# Patient Record
Sex: FEMALE | Race: WHITE
[De-identification: names, ages, dates, MRNs, and addresses within clinical notes are randomized per-mention and may not be internally consistent; named-entity substitution may affect disease eponyms.]

---

## 2019-03-24 ENCOUNTER — HOSPITAL ENCOUNTER (INPATIENT)
Dept: HOSPITAL 19 - COL.ER | Age: 69
LOS: 5 days | Discharge: NURSING, REHAB OR LONG-TERM CARE | DRG: 480 | End: 2019-03-29
Payer: MEDICARE

## 2019-03-24 VITALS — BODY MASS INDEX: 34.82 KG/M2 | WEIGHT: 203.93 LBS | HEIGHT: 64.02 IN

## 2019-03-24 DIAGNOSIS — S72.142A: Primary | ICD-10-CM

## 2019-03-24 DIAGNOSIS — M25.511: ICD-10-CM

## 2019-03-24 DIAGNOSIS — L03.311: ICD-10-CM

## 2019-03-24 DIAGNOSIS — E87.0: ICD-10-CM

## 2019-03-24 DIAGNOSIS — Z85.828: ICD-10-CM

## 2019-03-24 DIAGNOSIS — Z23: ICD-10-CM

## 2019-03-24 DIAGNOSIS — R65.20: ICD-10-CM

## 2019-03-24 DIAGNOSIS — M62.82: ICD-10-CM
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
## 2019-03-24 DIAGNOSIS — E87.2: ICD-10-CM

## 2019-03-24 DIAGNOSIS — E44.0: ICD-10-CM

## 2019-03-24 DIAGNOSIS — A41.9: ICD-10-CM

## 2019-03-24 DIAGNOSIS — R62.7: ICD-10-CM

## 2019-03-24 DIAGNOSIS — Z85.3: ICD-10-CM

## 2019-03-24 DIAGNOSIS — D64.9: ICD-10-CM

## 2019-03-24 LAB
ALBUMIN SERPL-MCNC: 4.1 GM/DL (ref 3.5–5)
ALP SERPL-CCNC: 129 U/L (ref 50–136)
ALT SERPL-CCNC: 47 U/L (ref 9–52)
ANION GAP SERPL CALC-SCNC: 15 MMOL/L (ref 7–16)
AST SERPL-CCNC: 54 U/L (ref 15–37)
BASOPHILS # BLD: 0.1 10*3/UL (ref 0–0.2)
BASOPHILS NFR BLD AUTO: 0.5 % (ref 0–2)
BILIRUB SERPL-MCNC: 1.8 MG/DL (ref 0–1)
BUN SERPL-MCNC: 53 MG/DL (ref 7–17)
CALCIUM SERPL-MCNC: 9.8 MG/DL (ref 8.4–10.2)
CHLORIDE SERPL-SCNC: 111 MMOL/L (ref 98–107)
CK SERPL-CCNC: 436 U/L (ref 30–135)
CO2 SERPL-SCNC: 21 MMOL/L (ref 22–30)
CREAT SERPL-SCNC: 0.93 MG/DL (ref 0.52–1.25)
EOSINOPHIL # BLD: 0.1 10*3/UL (ref 0–0.7)
EOSINOPHIL NFR BLD: 0.3 % (ref 0–4)
ERYTHROCYTE [DISTWIDTH] IN BLOOD BY AUTOMATED COUNT: 13.6 % (ref 11.5–14.5)
GLUCOSE SERPL-MCNC: 132 MG/DL (ref 74–106)
GRANULOCYTES # BLD AUTO: 84.7 % (ref 42.2–75.2)
HCT VFR BLD AUTO: 47.1 % (ref 37–47)
HGB BLD-MCNC: 15.4 G/DL (ref 12.5–16)
HYALINE CASTS #/AREA URNS LPF: (no result) /LPF
INR BLD: 1 (ref 0.8–3)
LYMPHOCYTES # BLD: 1.2 10*3/UL (ref 1.2–3.4)
LYMPHOCYTES NFR BLD: 7.6 % (ref 20–51)
MCH RBC QN AUTO: 29 PG (ref 27–31)
MCHC RBC AUTO-ENTMCNC: 33 G/DL (ref 33–37)
MCV RBC AUTO: 90 FL (ref 80–100)
MONOCYTES # BLD: 1 10*3/UL (ref 0.1–0.6)
MONOCYTES NFR BLD AUTO: 6.1 % (ref 1.7–9.3)
NEUTROPHILS # BLD: 13.5 10*3/UL (ref 1.4–6.5)
PH UR STRIP.AUTO: 5 [PH] (ref 5–8)
PLATELET # BLD AUTO: 235 K/MM3 (ref 130–400)
PMV BLD AUTO: 9.8 FL (ref 7.4–10.4)
POTASSIUM SERPL-SCNC: 4.4 MMOL/L (ref 3.4–5)
PROT SERPL-MCNC: 8.4 GM/DL (ref 6.4–8.2)
PROTHROMBIN TIME: 11.4 SECONDS (ref 9.7–12.8)
RBC # BLD AUTO: 5.23 M/MM3 (ref 4.1–5.3)
RBC # UR: (no result) /HPF
SODIUM SERPL-SCNC: 147 MMOL/L (ref 137–145)
SP GR UR STRIP.AUTO: 1.03 (ref 1–1.03)
SQUAMOUS # URNS: (no result) /HPF
TROPONIN I SERPL-MCNC: < 0.012 NG/ML (ref 0–0.04)
UA DIPSTICK PNL UR STRIP.AUTO: (no result)
URN COLLECT METHOD CLASS: (no result)
UROBILINOGEN UR STRIP.AUTO-MCNC: 2 MG/DL

## 2019-03-24 PROCEDURE — C1713 ANCHOR/SCREW BN/BN,TIS/BN: HCPCS

## 2019-03-24 PROCEDURE — C1751 CATH, INF, PER/CENT/MIDLINE: HCPCS

## 2019-03-24 PROCEDURE — A9284 NON-ELECTRONIC SPIROMETER: HCPCS

## 2019-03-24 PROCEDURE — C9113 INJ PANTOPRAZOLE SODIUM, VIA: HCPCS

## 2019-03-24 PROCEDURE — A4314 CATH W/DRAINAGE 2-WAY LATEX: HCPCS

## 2019-03-24 PROCEDURE — P9016 RBC LEUKOCYTES REDUCED: HCPCS

## 2019-03-25 VITALS — OXYGEN SATURATION: 77 %

## 2019-03-25 VITALS — OXYGEN SATURATION: 98 %

## 2019-03-25 VITALS — OXYGEN SATURATION: 100 %

## 2019-03-25 VITALS
HEART RATE: 105 BPM | SYSTOLIC BLOOD PRESSURE: 120 MMHG | OXYGEN SATURATION: 97 % | TEMPERATURE: 98 F | DIASTOLIC BLOOD PRESSURE: 80 MMHG

## 2019-03-25 VITALS — OXYGEN SATURATION: 99 %

## 2019-03-25 VITALS — OXYGEN SATURATION: 96 %

## 2019-03-25 VITALS — OXYGEN SATURATION: 90 %

## 2019-03-25 VITALS — OXYGEN SATURATION: 87 %

## 2019-03-25 VITALS — OXYGEN SATURATION: 92 %

## 2019-03-25 VITALS — OXYGEN SATURATION: 97 %

## 2019-03-25 VITALS — OXYGEN SATURATION: 89 %

## 2019-03-25 VITALS — OXYGEN SATURATION: 93 %

## 2019-03-25 VITALS — SYSTOLIC BLOOD PRESSURE: 111 MMHG | TEMPERATURE: 98 F | DIASTOLIC BLOOD PRESSURE: 92 MMHG | HEART RATE: 106 BPM

## 2019-03-25 VITALS — OXYGEN SATURATION: 95 %

## 2019-03-25 VITALS
TEMPERATURE: 97.7 F | SYSTOLIC BLOOD PRESSURE: 111 MMHG | OXYGEN SATURATION: 100 % | DIASTOLIC BLOOD PRESSURE: 69 MMHG | HEART RATE: 105 BPM

## 2019-03-25 VITALS — OXYGEN SATURATION: 94 %

## 2019-03-25 VITALS — OXYGEN SATURATION: 100 % | HEART RATE: 104 BPM | SYSTOLIC BLOOD PRESSURE: 110 MMHG | DIASTOLIC BLOOD PRESSURE: 63 MMHG

## 2019-03-25 VITALS — OXYGEN SATURATION: 81 %

## 2019-03-25 VITALS — OXYGEN SATURATION: 80 %

## 2019-03-25 VITALS — OXYGEN SATURATION: 86 %

## 2019-03-25 VITALS
HEART RATE: 112 BPM | OXYGEN SATURATION: 73 % | DIASTOLIC BLOOD PRESSURE: 108 MMHG | TEMPERATURE: 97.7 F | SYSTOLIC BLOOD PRESSURE: 124 MMHG

## 2019-03-25 VITALS — DIASTOLIC BLOOD PRESSURE: 50 MMHG | HEART RATE: 118 BPM | TEMPERATURE: 98.2 F | SYSTOLIC BLOOD PRESSURE: 143 MMHG

## 2019-03-25 VITALS
DIASTOLIC BLOOD PRESSURE: 52 MMHG | HEART RATE: 110 BPM | OXYGEN SATURATION: 96 % | TEMPERATURE: 98 F | SYSTOLIC BLOOD PRESSURE: 100 MMHG

## 2019-03-25 VITALS — OXYGEN SATURATION: 61 %

## 2019-03-25 VITALS — OXYGEN SATURATION: 66 %

## 2019-03-25 VITALS — OXYGEN SATURATION: 82 %

## 2019-03-25 VITALS — OXYGEN SATURATION: 88 %

## 2019-03-25 VITALS — OXYGEN SATURATION: 91 %

## 2019-03-25 VITALS — OXYGEN SATURATION: 98 % | HEART RATE: 114 BPM | SYSTOLIC BLOOD PRESSURE: 103 MMHG | DIASTOLIC BLOOD PRESSURE: 43 MMHG

## 2019-03-25 VITALS
HEART RATE: 107 BPM | DIASTOLIC BLOOD PRESSURE: 61 MMHG | TEMPERATURE: 98.6 F | SYSTOLIC BLOOD PRESSURE: 114 MMHG | OXYGEN SATURATION: 100 %

## 2019-03-25 VITALS — OXYGEN SATURATION: 70 %

## 2019-03-25 VITALS — OXYGEN SATURATION: 67 %

## 2019-03-25 VITALS — OXYGEN SATURATION: 79 %

## 2019-03-25 VITALS — OXYGEN SATURATION: 85 %

## 2019-03-25 VITALS — OXYGEN SATURATION: 76 %

## 2019-03-25 VITALS — OXYGEN SATURATION: 68 %

## 2019-03-25 VITALS — DIASTOLIC BLOOD PRESSURE: 64 MMHG | OXYGEN SATURATION: 100 % | SYSTOLIC BLOOD PRESSURE: 89 MMHG | HEART RATE: 109 BPM

## 2019-03-25 VITALS — OXYGEN SATURATION: 83 %

## 2019-03-25 VITALS — OXYGEN SATURATION: 78 %

## 2019-03-25 VITALS — OXYGEN SATURATION: 64 %

## 2019-03-25 VITALS — OXYGEN SATURATION: 75 %

## 2019-03-25 VITALS — OXYGEN SATURATION: 72 %

## 2019-03-25 LAB
ANION GAP SERPL CALC-SCNC: 5 MMOL/L (ref 7–16)
BASOPHILS # BLD: 0.1 10*3/UL (ref 0–0.2)
BASOPHILS NFR BLD AUTO: 0.4 % (ref 0–2)
BUN SERPL-MCNC: 43 MG/DL (ref 7–17)
CALCIUM SERPL-MCNC: 8.5 MG/DL (ref 8.4–10.2)
CHLORIDE SERPL-SCNC: 119 MMOL/L (ref 98–107)
CO2 SERPL-SCNC: 24 MMOL/L (ref 22–30)
CREAT SERPL-SCNC: 0.92 MG/DL (ref 0.52–1.25)
EOSINOPHIL # BLD: 0 10*3/UL (ref 0–0.7)
EOSINOPHIL NFR BLD: 0.3 % (ref 0–4)
ERYTHROCYTE [DISTWIDTH] IN BLOOD BY AUTOMATED COUNT: 14 % (ref 11.5–14.5)
GLUCOSE SERPL-MCNC: 113 MG/DL (ref 74–106)
GRANULOCYTES # BLD AUTO: 77.2 % (ref 42.2–75.2)
HCT VFR BLD AUTO: 33.6 % (ref 37–47)
HCT VFR BLD AUTO: 37.9 % (ref 37–47)
HGB BLD-MCNC: 10.2 G/DL (ref 12.5–16)
HGB BLD-MCNC: 11.8 G/DL (ref 12.5–16)
LYMPHOCYTES # BLD: 1.8 10*3/UL (ref 1.2–3.4)
LYMPHOCYTES NFR BLD: 12.4 % (ref 20–51)
MAGNESIUM SERPL-MCNC: 2.2 MG/DL (ref 1.6–2.3)
MCH RBC QN AUTO: 29 PG (ref 27–31)
MCHC RBC AUTO-ENTMCNC: 31 G/DL (ref 33–37)
MCV RBC AUTO: 94 FL (ref 80–100)
MONOCYTES # BLD: 1.3 10*3/UL (ref 0.1–0.6)
MONOCYTES NFR BLD AUTO: 9.2 % (ref 1.7–9.3)
NEUTROPHILS # BLD: 11 10*3/UL (ref 1.4–6.5)
PLATELET # BLD AUTO: 200 K/MM3 (ref 130–400)
PMV BLD AUTO: 10.4 FL (ref 7.4–10.4)
POTASSIUM SERPL-SCNC: 3.9 MMOL/L (ref 3.4–5)
RBC # BLD AUTO: 4.05 M/MM3 (ref 4.1–5.3)
SODIUM SERPL-SCNC: 148 MMOL/L (ref 137–145)

## 2019-03-25 NOTE — NUR
SW met with patient and cousin to discuss discharge planning.  Patient lives
alone in [place] and was down for four days before being found.  Patients
PCP is Dr Sutton and she obtains her medications from [facility].
Patient utilizes a walker but was not using it when she fell.  Patient does
not have a DPOA but was interested in one.  SW provided a form and will follow
up with patient is ready to sign.  Patient was presented with a choice form
for SNF and would like #1 [facility] and #2 [facility].  If those are unable to accept she
would like to go to somewhere in [place].  BRISA will continue to follow.

## 2019-03-25 NOTE — NUR
Per pt request, Juan Carlos Najera and Jessica Aria called and given update, ICU
phone number and privacy access code provided. Pt states she will not consent
to any procedure or sugery (PICC or GammaNail, respectively) until Juan Carlos Najera
is here. Juan Carlos stated that he has work today and doesnt get off until 5, but
will try to ask his boss to have the day off so as to be present for Nay.
Pt AAOx4, able to recall aforementioned family/friend's phone numbers from
memory. Conversation appropriate. Bedside report recieved from JACQUELINE Spangler - head
to toe assessment performed together - skin breakdown noted on abdomen, right
toe, breast, and under right breast - coccyx and gluteal fold unable to assess
d/t pain reported by pt when turn attempted.
Pain with rest/no movement rated at 0/10.

## 2019-03-25 NOTE — NUR
PT VERY PLEASANT. RATES PAIN 7.5/10 ON NUMERIC SCALE DESCRIBING PAIN AS AN
ACHE IN LEFT HIP RADIATING DOWN TO LEFT FOOT.
PT HAS TWO 20G IV'S, ONE IN RIGHT SHOULDER AND ONE IN RIGHT BREAST. PT STATES
SHE WAS LAYING FOR FOUR DAYS ON RIGHT SIDE OF BODY. RIGHT SHOULDER, RIGHT
BREAST, RIGHT FLANK, AND ABDOMEN ARE REDDENED. PT HAS EXCORIATION TO ABD,
RIGHT BREAST, AND GROIN. PT HAS LEFT MASTECTOMY FROM CANCER. SCARS TO RIGHT
SHOULDER FROM SHOULDER REPLACEMENT, TWO SCARS ON ABD FROM KIDNEY STENT AND
GALLBLADDER SURGERY.
PT HAS SLIGHT WEAKNESS ON RIGHT ARM.

## 2019-03-25 NOTE — NUR
Pt was assisted to more of a seated position with medication administration.
Pt agreed to drink some fluids at this time and ate some jello following
passing bedside swallow study. Pt tolerated clear liquids well. Pt agreed to
reposition to left side at this time. Pt wanted to attempt to turn on own
although extra staff member was obtained due to stiffness and decreased
mobility to right arm. Pt denied any pain prior to movement although hollered
out with repositioning onto left hip. Once pt was able to settle down
following repositioning pt verbalized improvement in pain. Education on
needing to stay on top of pain was provided with verbal agreement with pt
received to notify nurse when pt begins to return.

## 2019-03-25 NOTE — NUR
PT TRANSFERRED TO ICU3 AS SHE IS APPARENTLY SEPTIC.  REPORT CALLED TO ICU RN.
PT TRANSFERRED VIA HER BED WITH HER BELONGINGS.  PT HAD VALUABLES LOCKED UP IN
SAFE BY HOUSE SUPERVISOR.

## 2019-03-25 NOTE — NUR
Woke pt up in order to complete assessment at this time. Pt pleasant although
lethargic at this time. Cooperative with assessment and demonstrated ability
to follow instructions. Pt stated todays date was Thursday although was able
to correctly report the month.

## 2019-03-25 NOTE — NUR
Pt arrived on ICU bed awake and alert complaining of no pain. Left hip
assessed with Sierra,RN - pin point size spotting of blood through aquacell and
is CDI. Ice pack in place. VSS, unlabored respirations on 2L via nasal
cannula, 2pillows placed between legs for abduction, bilateral DEXTER and SCD in
place, pulses distally 2+ bilaterally, following commands, appropriate
conversation, cousin at bedside now from waiting room,

## 2019-03-25 NOTE — NUR
BRIAN Velarde with surgical team called to get update. Estimated time is unknown
for surgcial intervention - did mention to her that pt would like Juan Carlos to be
present.
Pt did consent to PICC line placement without his presence. Pt remains alert
and oriented.
Xray confirmation of PICC confirmed -  at bedside collecting sample
from central line

## 2019-03-26 VITALS — OXYGEN SATURATION: 100 %

## 2019-03-26 VITALS — OXYGEN SATURATION: 99 %

## 2019-03-26 VITALS — OXYGEN SATURATION: 96 %

## 2019-03-26 VITALS — OXYGEN SATURATION: 92 %

## 2019-03-26 VITALS — SYSTOLIC BLOOD PRESSURE: 101 MMHG | HEART RATE: 104 BPM | DIASTOLIC BLOOD PRESSURE: 50 MMHG | OXYGEN SATURATION: 99 %

## 2019-03-26 VITALS — OXYGEN SATURATION: 98 %

## 2019-03-26 VITALS — TEMPERATURE: 97.3 F | SYSTOLIC BLOOD PRESSURE: 107 MMHG | HEART RATE: 110 BPM | DIASTOLIC BLOOD PRESSURE: 67 MMHG

## 2019-03-26 VITALS
DIASTOLIC BLOOD PRESSURE: 51 MMHG | HEART RATE: 101 BPM | OXYGEN SATURATION: 100 % | TEMPERATURE: 98.7 F | SYSTOLIC BLOOD PRESSURE: 98 MMHG

## 2019-03-26 VITALS — OXYGEN SATURATION: 95 %

## 2019-03-26 VITALS — OXYGEN SATURATION: 93 %

## 2019-03-26 VITALS — OXYGEN SATURATION: 97 %

## 2019-03-26 VITALS — OXYGEN SATURATION: 94 %

## 2019-03-26 VITALS — OXYGEN SATURATION: 88 %

## 2019-03-26 VITALS — OXYGEN SATURATION: 84 %

## 2019-03-26 VITALS — OXYGEN SATURATION: 86 %

## 2019-03-26 VITALS
DIASTOLIC BLOOD PRESSURE: 48 MMHG | SYSTOLIC BLOOD PRESSURE: 94 MMHG | OXYGEN SATURATION: 100 % | HEART RATE: 99 BPM | TEMPERATURE: 99.1 F

## 2019-03-26 VITALS — OXYGEN SATURATION: 90 %

## 2019-03-26 VITALS — OXYGEN SATURATION: 91 %

## 2019-03-26 VITALS — OXYGEN SATURATION: 89 %

## 2019-03-26 VITALS
TEMPERATURE: 99 F | SYSTOLIC BLOOD PRESSURE: 107 MMHG | OXYGEN SATURATION: 94 % | HEART RATE: 99 BPM | DIASTOLIC BLOOD PRESSURE: 61 MMHG

## 2019-03-26 VITALS — DIASTOLIC BLOOD PRESSURE: 50 MMHG | SYSTOLIC BLOOD PRESSURE: 105 MMHG | HEART RATE: 96 BPM | TEMPERATURE: 97.9 F

## 2019-03-26 VITALS — OXYGEN SATURATION: 85 %

## 2019-03-26 LAB
ANION GAP SERPL CALC-SCNC: 3 MMOL/L (ref 7–16)
BASOPHILS # BLD: 0 10*3/UL (ref 0–0.2)
BASOPHILS NFR BLD AUTO: 0.2 % (ref 0–2)
BUN SERPL-MCNC: 25 MG/DL (ref 7–17)
CALCIUM SERPL-MCNC: 7.9 MG/DL (ref 8.4–10.2)
CHLORIDE SERPL-SCNC: 118 MMOL/L (ref 98–107)
CO2 SERPL-SCNC: 23 MMOL/L (ref 22–30)
CREAT SERPL-SCNC: 0.93 MG/DL (ref 0.52–1.25)
EOSINOPHIL # BLD: 0 10*3/UL (ref 0–0.7)
EOSINOPHIL NFR BLD: 0 % (ref 0–4)
ERYTHROCYTE [DISTWIDTH] IN BLOOD BY AUTOMATED COUNT: 14 % (ref 11.5–14.5)
GLUCOSE SERPL-MCNC: 118 MG/DL (ref 74–106)
GRANULOCYTES # BLD AUTO: 84.6 % (ref 42.2–75.2)
HCT VFR BLD AUTO: 26.5 % (ref 37–47)
HGB BLD-MCNC: 8.1 G/DL (ref 12.5–16)
LYMPHOCYTES # BLD: 0.8 10*3/UL (ref 1.2–3.4)
LYMPHOCYTES NFR BLD: 7.1 % (ref 20–51)
MCH RBC QN AUTO: 29 PG (ref 27–31)
MCHC RBC AUTO-ENTMCNC: 31 G/DL (ref 33–37)
MCV RBC AUTO: 96 FL (ref 80–100)
MONOCYTES # BLD: 0.8 10*3/UL (ref 0.1–0.6)
MONOCYTES NFR BLD AUTO: 7.5 % (ref 1.7–9.3)
NEUTROPHILS # BLD: 9.1 10*3/UL (ref 1.4–6.5)
PLATELET # BLD AUTO: 130 K/MM3 (ref 130–400)
PMV BLD AUTO: 10 FL (ref 7.4–10.4)
POTASSIUM SERPL-SCNC: 3.9 MMOL/L (ref 3.4–5)
RBC # BLD AUTO: 2.75 M/MM3 (ref 4.1–5.3)
SODIUM SERPL-SCNC: 144 MMOL/L (ref 137–145)

## 2019-03-26 PROCEDURE — 0QS736Z REPOSITION LEFT UPPER FEMUR WITH INTRAMEDULLARY INTERNAL FIXATION DEVICE, PERCUTANEOUS APPROACH: ICD-10-PCS | Performed by: ORTHOPAEDIC SURGERY

## 2019-03-26 NOTE — NUR
awake and CNA in and checking vital signs, assessment completed, see shift
assessment for further info, will assist her with ordering supper, denies urge
to void or other needs

## 2019-03-26 NOTE — NUR
BRISA and SW student met with patient after clinical rounding.  She would like a
referral sent to [facility] as well.  SW fax[facility]  She reports she is going to be gone after today for a
week so she should be able to have a decision today.
 
[facility] has accepted patient.

## 2019-03-26 NOTE — NUR
Patient accepted to [facility] for tomorrow or thursday.  If patient
dc tomorrow the doc to doc will be with Dr Bah [phone].  Nurses need
called tomorrow with update if patient will be coming and what day.  BRISA
informed BRISA Eagle on surgical floor.

## 2019-03-26 NOTE — NUR
Dr. Knight rounds on patient at this time. Orders as entered CPOE. Provided
states preference for patient to move to surgical floor today.

## 2019-03-26 NOTE — NUR
Shift assessment complete at this time. Plan of care reviewed with patient et
family at bedside. Additional time taken to address any other needs or
concerns. Denies pain or discomfort. Will continue to monitor.

## 2019-03-26 NOTE — NUR
SW and SW student met with the patient to inform of [facility]
accepting and how she will need transportation if she chooses to pursue
[facility]. The patient reports that she only has her cousin and is not sure if
he would be able to provide transport. The patient reports that she needs to
tonight to think about whether she wants to pursue [facility],
[facility], or [facility]. SW to follow up with the patient
tomorrow morning, 3/27.

## 2019-03-27 VITALS — DIASTOLIC BLOOD PRESSURE: 69 MMHG | SYSTOLIC BLOOD PRESSURE: 127 MMHG | HEART RATE: 101 BPM | TEMPERATURE: 98.3 F

## 2019-03-27 VITALS — SYSTOLIC BLOOD PRESSURE: 133 MMHG | HEART RATE: 109 BPM | TEMPERATURE: 98 F | DIASTOLIC BLOOD PRESSURE: 64 MMHG

## 2019-03-27 VITALS — HEART RATE: 97 BPM | DIASTOLIC BLOOD PRESSURE: 80 MMHG | SYSTOLIC BLOOD PRESSURE: 118 MMHG | TEMPERATURE: 98.4 F

## 2019-03-27 VITALS — DIASTOLIC BLOOD PRESSURE: 84 MMHG | SYSTOLIC BLOOD PRESSURE: 115 MMHG | TEMPERATURE: 98.1 F | HEART RATE: 132 BPM

## 2019-03-27 VITALS — DIASTOLIC BLOOD PRESSURE: 66 MMHG | SYSTOLIC BLOOD PRESSURE: 110 MMHG | TEMPERATURE: 98 F | HEART RATE: 110 BPM

## 2019-03-27 VITALS — SYSTOLIC BLOOD PRESSURE: 99 MMHG | TEMPERATURE: 98.9 F | HEART RATE: 122 BPM | DIASTOLIC BLOOD PRESSURE: 52 MMHG

## 2019-03-27 VITALS — HEART RATE: 95 BPM | DIASTOLIC BLOOD PRESSURE: 61 MMHG | TEMPERATURE: 98.5 F | SYSTOLIC BLOOD PRESSURE: 129 MMHG

## 2019-03-27 LAB
ANION GAP SERPL CALC-SCNC: 3 MMOL/L (ref 7–16)
BASOPHILS # BLD: 0 10*3/UL (ref 0–0.2)
BASOPHILS NFR BLD AUTO: 0.3 % (ref 0–2)
BUN SERPL-MCNC: 18 MG/DL (ref 7–17)
CALCIUM SERPL-MCNC: 8 MG/DL (ref 8.4–10.2)
CHLORIDE SERPL-SCNC: 114 MMOL/L (ref 98–107)
CO2 SERPL-SCNC: 25 MMOL/L (ref 22–30)
CREAT SERPL-SCNC: 0.9 MG/DL (ref 0.52–1.25)
EOSINOPHIL # BLD: 0.2 10*3/UL (ref 0–0.7)
EOSINOPHIL NFR BLD: 2 % (ref 0–4)
ERYTHROCYTE [DISTWIDTH] IN BLOOD BY AUTOMATED COUNT: 13.8 % (ref 11.5–14.5)
GLUCOSE SERPL-MCNC: 103 MG/DL (ref 74–106)
GRANULOCYTES # BLD AUTO: 75.4 % (ref 42.2–75.2)
HCT VFR BLD AUTO: 25.3 % (ref 37–47)
HGB BLD-MCNC: 7.8 G/DL (ref 12.5–16)
LYMPHOCYTES # BLD: 1.3 10*3/UL (ref 1.2–3.4)
LYMPHOCYTES NFR BLD: 14.9 % (ref 20–51)
MCH RBC QN AUTO: 29 PG (ref 27–31)
MCHC RBC AUTO-ENTMCNC: 31 G/DL (ref 33–37)
MCV RBC AUTO: 95 FL (ref 80–100)
MONOCYTES # BLD: 0.6 10*3/UL (ref 0.1–0.6)
MONOCYTES NFR BLD AUTO: 6.8 % (ref 1.7–9.3)
NEUTROPHILS # BLD: 6.7 10*3/UL (ref 1.4–6.5)
PLATELET # BLD AUTO: 123 K/MM3 (ref 130–400)
PMV BLD AUTO: 9.8 FL (ref 7.4–10.4)
POTASSIUM SERPL-SCNC: 3.7 MMOL/L (ref 3.4–5)
RBC # BLD AUTO: 2.66 M/MM3 (ref 4.1–5.3)
SODIUM SERPL-SCNC: 142 MMOL/L (ref 137–145)

## 2019-03-27 NOTE — NUR
physical therapy in and worked with patient and assisted her out of bed and
into recliner, assisted her with ordering lunch

## 2019-03-27 NOTE — NUR
assisted up to bedside commode after much encouragement, took few short steps
to commode, had small bowel movment while up

## 2019-03-27 NOTE — NUR
Patient in bed, is alert and oriented x3. Has right PICC without redness or
swelling. Watching TV. Has numerous abrasions and healing lesions on rt breast
and rt abdomen. Weakened right arm,  are equal. Has had left mastectomy.
Bilateral lower leg swelling. Wearing SCD's bilaterally. Uses call light
effectively.

## 2019-03-27 NOTE — NUR
appears to be dozing, awakened and full assessment completed, see
interventions for further info, breakfast ordered

## 2019-03-27 NOTE — NUR
physical therapy in to work with patient, therapist and CNA assisted her to
bedside commode and she voided and then back into bed, CNA will help her with
ordering supper

## 2019-03-27 NOTE — NUR
Dr Knight and care team in to see patient, She is sitting up in bed eating
breakfast, telemetry discontinued and IV fluis stopped

## 2019-03-27 NOTE — NUR
BRISA and BRISA student attended clinical rounds and then followed up with the
patient on preference for post-acute rehab. The patient reports that she has
decided to pursue [facility]. SW contacted and updated Emma at
[facility]. SW to update [facility] and
continue to follow.

## 2019-03-27 NOTE — NUR
Pt sleeping comfortably in bed. Denies pain or any other discomfort. Vitals
stable at this time. Will continue to monitor.

## 2019-03-27 NOTE — NUR
Pt resting in bed. Vitals stable. Reports L hip pain improving after PRN
administration. Denies any other complaints. Will continue to monitor.

## 2019-03-28 VITALS — SYSTOLIC BLOOD PRESSURE: 114 MMHG | DIASTOLIC BLOOD PRESSURE: 59 MMHG | HEART RATE: 94 BPM | TEMPERATURE: 97.6 F

## 2019-03-28 VITALS — HEART RATE: 106 BPM | SYSTOLIC BLOOD PRESSURE: 127 MMHG | DIASTOLIC BLOOD PRESSURE: 74 MMHG | TEMPERATURE: 98.2 F

## 2019-03-28 VITALS — DIASTOLIC BLOOD PRESSURE: 70 MMHG | HEART RATE: 110 BPM | TEMPERATURE: 98.2 F | SYSTOLIC BLOOD PRESSURE: 125 MMHG

## 2019-03-28 VITALS — SYSTOLIC BLOOD PRESSURE: 129 MMHG | TEMPERATURE: 97.8 F | DIASTOLIC BLOOD PRESSURE: 50 MMHG | HEART RATE: 110 BPM

## 2019-03-28 VITALS — DIASTOLIC BLOOD PRESSURE: 57 MMHG | TEMPERATURE: 98.8 F | SYSTOLIC BLOOD PRESSURE: 134 MMHG | HEART RATE: 113 BPM

## 2019-03-28 VITALS — HEART RATE: 112 BPM | DIASTOLIC BLOOD PRESSURE: 65 MMHG | TEMPERATURE: 98.7 F | SYSTOLIC BLOOD PRESSURE: 115 MMHG

## 2019-03-28 VITALS — TEMPERATURE: 98.2 F | HEART RATE: 112 BPM | DIASTOLIC BLOOD PRESSURE: 68 MMHG | SYSTOLIC BLOOD PRESSURE: 99 MMHG

## 2019-03-28 VITALS — DIASTOLIC BLOOD PRESSURE: 50 MMHG | TEMPERATURE: 99 F | SYSTOLIC BLOOD PRESSURE: 129 MMHG | HEART RATE: 113 BPM

## 2019-03-28 VITALS — HEART RATE: 94 BPM | TEMPERATURE: 99.5 F | DIASTOLIC BLOOD PRESSURE: 54 MMHG | SYSTOLIC BLOOD PRESSURE: 96 MMHG

## 2019-03-28 VITALS — HEART RATE: 102 BPM | TEMPERATURE: 97.8 F | DIASTOLIC BLOOD PRESSURE: 59 MMHG | SYSTOLIC BLOOD PRESSURE: 112 MMHG

## 2019-03-28 LAB
ANION GAP SERPL CALC-SCNC: 5 MMOL/L (ref 7–16)
BASOPHILS # BLD: 0 10*3/UL (ref 0–0.2)
BASOPHILS NFR BLD AUTO: 0.4 % (ref 0–2)
BUN SERPL-MCNC: 21 MG/DL (ref 7–17)
CALCIUM SERPL-MCNC: 8.2 MG/DL (ref 8.4–10.2)
CHLORIDE SERPL-SCNC: 111 MMOL/L (ref 98–107)
CO2 SERPL-SCNC: 25 MMOL/L (ref 22–30)
CREAT SERPL-SCNC: 0.99 UMOL/L (ref 0.52–1.25)
EOSINOPHIL # BLD: 0.4 10*3/UL (ref 0–0.7)
EOSINOPHIL NFR BLD: 5 % (ref 0–4)
ERYTHROCYTE [DISTWIDTH] IN BLOOD BY AUTOMATED COUNT: 14.1 % (ref 11.5–14.5)
GLUCOSE SERPL-MCNC: 94 MG/DL (ref 74–106)
GRANULOCYTES # BLD AUTO: 63.1 % (ref 42.2–75.2)
HCT VFR BLD AUTO: 23.1 % (ref 37–47)
HGB BLD-MCNC: 7.2 G/DL (ref 12.5–16)
LYMPHOCYTES # BLD: 2 10*3/UL (ref 1.2–3.4)
LYMPHOCYTES NFR BLD: 24.1 % (ref 20–51)
MCH RBC QN AUTO: 30 PG (ref 27–31)
MCHC RBC AUTO-ENTMCNC: 31 G/DL (ref 33–37)
MCV RBC AUTO: 95 FL (ref 80–100)
MONOCYTES # BLD: 0.5 10*3/UL (ref 0.1–0.6)
MONOCYTES NFR BLD AUTO: 6.2 % (ref 1.7–9.3)
NEUTROPHILS # BLD: 5.2 10*3/UL (ref 1.4–6.5)
PLATELET # BLD AUTO: 134 K/MM3 (ref 130–400)
PMV BLD AUTO: 9.8 FL (ref 7.4–10.4)
POTASSIUM SERPL-SCNC: 3.7 MMOL/L (ref 3.4–5)
RBC # BLD AUTO: 2.44 M/MM3 (ref 4.1–5.3)
SODIUM SERPL-SCNC: 141 MMOL/L (ref 137–145)

## 2019-03-28 NOTE — NUR
assisted from bedside commode to recliner, is a max 2 assist and she has
difficulty standing straight, abrasion to right breast with some oozing and
covered with telfa and paper tape, student nurse assisting with care, full
assessment completed

## 2019-03-28 NOTE — NUR
Patient in bed, awakened for HS meds. Takes meds without problem. Reports pain
to left hip "hurts some". Has Aquacell drsg to left hip and gauze with
occlusive distal to Aquacell, ice pack in place. Has a right PICC without
redness or swelling. Is alert and oriented x3. Bilateral lower leg edema
present. Abrasions and lesions to right breast healing. Right abdomen and
right hip abrasions healing as well. Patient voiding per BSC and wears a
depends for occasional incontinence.

## 2019-03-28 NOTE — NUR
Patient is resting in chair. No reaction noted to blood transfusion. pain
rated @ 4/10. She voices no complinats at this time. Will continue to
monitor.

## 2019-03-28 NOTE — NUR
BRISA and BRISA student attended clinical rounds. The patient's blood count went
down and they plan to give her a unit of blood today. BRISA then contacted and
faxed updates to Emma at [facility]. SW to continue to follow.

## 2019-03-28 NOTE — NUR
assisted up to bedside commode with therapist and this nurse, was able to take
few small steps from recliner to commode, voided large amount and h ad large
bowel movement, then assited into bed,

## 2019-03-28 NOTE — NUR
Patient is resting in bed. No drainage, redness, or edema noted to AGAPITO PICC
line. Patient rates pain 4/10 with movment. She voices no complains the this
time. Will continuie to monitor.

## 2019-03-28 NOTE — NUR
Patient up to BSC with 2 MAX assist. Had been incontinent and also voids in
commode. Right PICC intact, no redness or swelling. Aquacell and gauze intact
to left hip. Ice pack placed. Returned to bed with 2 assist and gait belt,
better transfer back to bed.

## 2019-03-29 VITALS — DIASTOLIC BLOOD PRESSURE: 88 MMHG | TEMPERATURE: 97.7 F | SYSTOLIC BLOOD PRESSURE: 105 MMHG | HEART RATE: 113 BPM

## 2019-03-29 VITALS — HEART RATE: 105 BPM | DIASTOLIC BLOOD PRESSURE: 50 MMHG | SYSTOLIC BLOOD PRESSURE: 126 MMHG | TEMPERATURE: 98.2 F

## 2019-03-29 VITALS — TEMPERATURE: 98.3 F | HEART RATE: 103 BPM | SYSTOLIC BLOOD PRESSURE: 105 MMHG | DIASTOLIC BLOOD PRESSURE: 57 MMHG

## 2019-03-29 VITALS — HEART RATE: 103 BPM | SYSTOLIC BLOOD PRESSURE: 105 MMHG | DIASTOLIC BLOOD PRESSURE: 57 MMHG | TEMPERATURE: 98.3 F

## 2019-03-29 LAB
ANION GAP SERPL CALC-SCNC: 5 MMOL/L (ref 7–16)
BUN SERPL-MCNC: 19 MG/DL (ref 7–17)
CALCIUM SERPL-MCNC: 8.4 MG/DL (ref 8.4–10.2)
CHLORIDE SERPL-SCNC: 112 MMOL/L (ref 98–107)
CO2 SERPL-SCNC: 25 MMOL/L (ref 22–30)
CREAT SERPL-SCNC: 0.85 UMOL/L (ref 0.52–1.25)
EOSINOPHIL NFR BLD: 7 % (ref 0–4)
ERYTHROCYTE [DISTWIDTH] IN BLOOD BY AUTOMATED COUNT: 15 % (ref 11.5–14.5)
GLUCOSE SERPL-MCNC: 97 MG/DL (ref 74–106)
HCT VFR BLD AUTO: 26.7 % (ref 37–47)
HGB BLD-MCNC: 8.3 G/DL (ref 12.5–16)
LYMPHOCYTES NFR BLD MANUAL: 16 % (ref 20–51)
MCH RBC QN AUTO: 29 PG (ref 27–31)
MCHC RBC AUTO-ENTMCNC: 31 G/DL (ref 33–37)
MCV RBC AUTO: 94 FL (ref 80–100)
METAMYELOCYTES NFR BLD MANUAL: 2 % (ref 0–0)
MONOCYTES NFR BLD: 4 % (ref 1.7–9.3)
NEUTS BAND NFR BLD: 1 % (ref 0–10)
NEUTS SEG NFR BLD MANUAL: 70 % (ref 42–75.2)
PLATELET # BLD AUTO: 171 K/MM3 (ref 130–400)
PLATELET BLD QL SMEAR: NORMAL
PMV BLD AUTO: 9.9 FL (ref 7.4–10.4)
POTASSIUM SERPL-SCNC: 3.5 MMOL/L (ref 3.4–5)
RBC # BLD AUTO: 2.85 M/MM3 (ref 4.1–5.3)
SODIUM SERPL-SCNC: 142 MMOL/L (ref 137–145)

## 2019-03-29 NOTE — NUR
Patient incontinent of large amount of urine. Assisted to BSC with MAX 2
assist to void. Patient resists movement, yelling "I can't do it". With much
encouragement patient is able to take some steps and sit on BSC. Voids and
assisted back to bed. Patient asks for 3 people to transfer her, with much
encouragement patient is able to take steps to the bed, assisted with 2 and
the gait belt and her walker. Unable to lift her legs, assisted by staff back
to bed. Ice pack replaced to left hip. Medicated with Norco 7.5mg 2 tabs prior
to transfer.

## 2019-03-29 NOTE — NUR
Patient is resting in bed. Left hip dressing remains CDI. Patient rates pain @
0/10. No edema, redness, or pain noted to AGAPITO PICC line. She
is scheudled to be
discharged today. No complaints at this will. Will continue to monitor.

## 2019-03-29 NOTE — NUR
Patient is resting in bed. No redness, drainage, or edema noted to AGAPITO PICC
line. Left hip dressing remains CDI. Pain rated @ 3/10. She voices no
complaints at this time. Call light within reach. Will continue to monitor.

## 2019-03-29 NOTE — NUR
Patient alert and oriented, answers questions appropriately.  See assessment.
Patient has notable anxiety with attempting to do turn and perform pericare.
LLE with incisions well approximated, staples intact.  Aquacel placed over
incisions.  FWB.  2+BLE edema, pulses palpable.  No c/o at this time.

## 2019-03-29 NOTE — NUR
The patient is to discharge today, 3/29/19, to [facility] for a skilled
stay. Transportation was set for 1300 via [facility]. SW informed the patient,
patients nurse, and the patients cousin via phone. They were all in agreeance.
BRISA student presented and reviewed IM form to the patient. The patient
was agreeable, signed, and was given a copy. No additional needs at this time.

## 2019-04-16 ENCOUNTER — HOSPITAL ENCOUNTER (EMERGENCY)
Dept: HOSPITAL 19 - COL.ER | Age: 69
LOS: 1 days | Discharge: HOME | End: 2019-04-17
Payer: MEDICARE

## 2019-04-16 ENCOUNTER — HOSPITAL ENCOUNTER (OUTPATIENT)
Dept: HOSPITAL 19 - ZCOL.LAB | Age: 69
End: 2019-04-16
Attending: INTERNAL MEDICINE

## 2019-04-16 VITALS — HEIGHT: 64.02 IN | WEIGHT: 200.4 LBS | BODY MASS INDEX: 34.21 KG/M2

## 2019-04-16 DIAGNOSIS — N39.0: Primary | ICD-10-CM

## 2019-04-16 DIAGNOSIS — Z01.89: Primary | ICD-10-CM

## 2019-04-16 DIAGNOSIS — Z79.82: ICD-10-CM

## 2019-04-16 DIAGNOSIS — Z79.01: ICD-10-CM

## 2019-04-16 DIAGNOSIS — I10: ICD-10-CM

## 2019-04-16 LAB
ALBUMIN SERPL-MCNC: 3.6 GM/DL (ref 3.5–5)
ALP SERPL-CCNC: 154 U/L (ref 50–136)
ALT SERPL-CCNC: 8 U/L (ref 9–52)
ANION GAP SERPL CALC-SCNC: 10 MMOL/L (ref 7–16)
AST SERPL-CCNC: 21 U/L (ref 15–37)
BASOPHILS # BLD: 0 10*3/UL (ref 0–0.2)
BASOPHILS NFR BLD AUTO: 0.3 % (ref 0–2)
BILIRUB SERPL-MCNC: 0.6 MG/DL (ref 0–1)
BUN SERPL-MCNC: 16 MG/DL (ref 7–17)
CALCIUM SERPL-MCNC: 8.7 MG/DL (ref 8.4–10.2)
CHLORIDE SERPL-SCNC: 99 MMOL/L (ref 98–107)
CO2 SERPL-SCNC: 27 MMOL/L (ref 22–30)
CREAT SERPL-SCNC: 0.96 UMOL/L (ref 0.52–1.25)
EOSINOPHIL # BLD: 0.1 10*3/UL (ref 0–0.7)
EOSINOPHIL NFR BLD: 0.6 % (ref 0–4)
ERYTHROCYTE [DISTWIDTH] IN BLOOD BY AUTOMATED COUNT: 15 % (ref 11.5–14.5)
GLUCOSE SERPL-MCNC: 126 MG/DL (ref 74–106)
GRANULOCYTES # BLD AUTO: 74.1 % (ref 42.2–75.2)
HCT VFR BLD AUTO: 32.2 % (ref 37–47)
HGB BLD-MCNC: 10.2 G/DL (ref 12.5–16)
LYMPHOCYTES # BLD: 1.5 10*3/UL (ref 1.2–3.4)
LYMPHOCYTES NFR BLD: 16.4 % (ref 20–51)
MCH RBC QN AUTO: 29 PG (ref 27–31)
MCHC RBC AUTO-ENTMCNC: 32 G/DL (ref 33–37)
MCV RBC AUTO: 93 FL (ref 80–100)
MONOCYTES # BLD: 0.7 10*3/UL (ref 0.1–0.6)
MONOCYTES NFR BLD AUTO: 8 % (ref 1.7–9.3)
NEUTROPHILS # BLD: 6.6 10*3/UL (ref 1.4–6.5)
PH UR STRIP.AUTO: 6 [PH] (ref 5–8)
PH UR STRIP.AUTO: 6 [PH] (ref 5–8)
PLATELET # BLD AUTO: 278 K/MM3 (ref 130–400)
PMV BLD AUTO: 9.1 FL (ref 7.4–10.4)
POTASSIUM SERPL-SCNC: 3.6 MMOL/L (ref 3.4–5)
PROT SERPL-MCNC: 7.5 GM/DL (ref 6.4–8.2)
RBC # BLD AUTO: 3.47 M/MM3 (ref 4.1–5.3)
RBC # UR STRIP.AUTO: (no result) /UL
RBC # UR STRIP.AUTO: (no result) /UL
RBC # UR: (no result) /HPF
SODIUM SERPL-SCNC: 136 MMOL/L (ref 137–145)
SP GR UR STRIP.AUTO: 1 (ref 1–1.03)
SP GR UR STRIP.AUTO: 1.01 (ref 1–1.03)
SQUAMOUS # URNS: (no result) /HPF
SQUAMOUS # URNS: (no result) /HPF
URINE LEUKOCYTE ESTERASE: (no result)
URINE LEUKOCYTE ESTERASE: (no result)
URN COLLECT METHOD CLASS: (no result)
URN COLLECT METHOD CLASS: (no result)

## 2019-04-17 VITALS — SYSTOLIC BLOOD PRESSURE: 117 MMHG | HEART RATE: 98 BPM | DIASTOLIC BLOOD PRESSURE: 53 MMHG | TEMPERATURE: 98.3 F

## 2019-04-20 ENCOUNTER — HOSPITAL ENCOUNTER (OUTPATIENT)
Dept: HOSPITAL 19 - ZCOL.LAB | Age: 69
End: 2019-04-20
Attending: INTERNAL MEDICINE
Payer: MEDICARE

## 2019-04-20 DIAGNOSIS — R65.20: Primary | ICD-10-CM

## 2019-04-20 LAB
PH UR STRIP.AUTO: 5 [PH] (ref 5–8)
RBC # UR: (no result) /HPF
SP GR UR STRIP.AUTO: 1 (ref 1–1.03)
SQUAMOUS # URNS: (no result) /HPF
URN COLLECT METHOD CLASS: (no result)